# Patient Record
Sex: FEMALE | Race: BLACK OR AFRICAN AMERICAN | Employment: UNEMPLOYED | URBAN - METROPOLITAN AREA
[De-identification: names, ages, dates, MRNs, and addresses within clinical notes are randomized per-mention and may not be internally consistent; named-entity substitution may affect disease eponyms.]

---

## 2022-02-23 ENCOUNTER — HOSPITAL ENCOUNTER (OUTPATIENT)
Age: 62
Discharge: HOME | End: 2022-02-23

## 2022-02-23 ENCOUNTER — HOSPITAL ENCOUNTER (OUTPATIENT)
Dept: GENERAL RADIOLOGY | Age: 62
Discharge: HOME | End: 2022-02-23

## 2022-02-23 DIAGNOSIS — R06.02 SHORTNESS OF BREATH: ICD-10-CM

## 2022-02-23 PROCEDURE — 71046 X-RAY EXAM CHEST 2 VIEWS: CPT

## 2023-05-05 ENCOUNTER — HOSPITAL ENCOUNTER (OUTPATIENT)
Age: 63
Discharge: HOME | End: 2023-05-05
Payer: COMMERCIAL

## 2023-05-05 LAB
25(OH)D3 SERPL-MCNC: 15.9 NG/ML
ALBUMIN SERPL-MCNC: 4.4 G/DL (ref 3.4–5)
ALBUMIN/GLOB SERPL: 1.1 {RATIO} (ref 1.1–2.2)
ALP SERPL-CCNC: 58 U/L (ref 40–129)
ALT SERPL-CCNC: 16 U/L (ref 10–40)
ANION GAP SERPL CALCULATED.3IONS-SCNC: 7 MMOL/L (ref 3–16)
AST SERPL-CCNC: 20 U/L (ref 15–37)
BASOPHILS # BLD: 0 K/UL (ref 0–0.2)
BASOPHILS NFR BLD: 0.2 %
BILIRUB SERPL-MCNC: 0.4 MG/DL (ref 0–1)
BUN SERPL-MCNC: 10 MG/DL (ref 7–20)
CALCIUM SERPL-MCNC: 9.6 MG/DL (ref 8.3–10.6)
CHLORIDE SERPL-SCNC: 101 MMOL/L (ref 99–110)
CHOLEST SERPL-MCNC: 122 MG/DL (ref 0–199)
CO2 SERPL-SCNC: 30 MMOL/L (ref 21–32)
CREAT SERPL-MCNC: 0.6 MG/DL (ref 0.6–1.2)
CREAT UR-MCNC: 101.6 MG/DL (ref 28–259)
DEPRECATED RDW RBC AUTO: 17.5 % (ref 12.4–15.4)
EOSINOPHIL # BLD: 0.2 K/UL (ref 0–0.6)
EOSINOPHIL NFR BLD: 2.7 %
EST. AVERAGE GLUCOSE BLD GHB EST-MCNC: 145.6 MG/DL
FOLATE SERPL-MCNC: 5.39 NG/ML (ref 4.78–24.2)
GFR SERPLBLD CREATININE-BSD FMLA CKD-EPI: >60 ML/MIN/{1.73_M2}
GLUCOSE SERPL-MCNC: 129 MG/DL (ref 70–99)
HBA1C MFR BLD: 6.7 %
HCT VFR BLD AUTO: 38.1 % (ref 36–48)
HDLC SERPL-MCNC: 44 MG/DL (ref 40–60)
HGB BLD-MCNC: 12 G/DL (ref 12–16)
LDLC SERPL CALC-MCNC: 67 MG/DL
LYMPHOCYTES # BLD: 2.4 K/UL (ref 1–5.1)
LYMPHOCYTES NFR BLD: 33.5 %
MCH RBC QN AUTO: 22.3 PG (ref 26–34)
MCHC RBC AUTO-ENTMCNC: 31.5 G/DL (ref 31–36)
MCV RBC AUTO: 70.7 FL (ref 80–100)
MICROALBUMIN UR DL<=1MG/L-MCNC: 1.4 MG/DL
MICROALBUMIN/CREAT UR: 13.8 MG/G (ref 0–30)
MONOCYTES # BLD: 0.7 K/UL (ref 0–1.3)
MONOCYTES NFR BLD: 9.9 %
NEUTROPHILS # BLD: 3.8 K/UL (ref 1.7–7.7)
NEUTROPHILS NFR BLD: 53.7 %
PLATELET # BLD AUTO: 272 K/UL (ref 135–450)
PMV BLD AUTO: 9.8 FL (ref 5–10.5)
POTASSIUM SERPL-SCNC: 4.6 MMOL/L (ref 3.5–5.1)
PROT SERPL-MCNC: 8.3 G/DL (ref 6.4–8.2)
RBC # BLD AUTO: 5.39 M/UL (ref 4–5.2)
SODIUM SERPL-SCNC: 138 MMOL/L (ref 136–145)
T4 FREE SERPL-MCNC: 1.4 NG/DL (ref 0.9–1.8)
TRIGL SERPL-MCNC: 55 MG/DL (ref 0–150)
TSH SERPL DL<=0.005 MIU/L-ACNC: 2 UIU/ML (ref 0.27–4.2)
VIT B12 SERPL-MCNC: 1014 PG/ML (ref 211–911)
VLDLC SERPL CALC-MCNC: 11 MG/DL
WBC # BLD AUTO: 7 K/UL (ref 4–11)

## 2023-05-05 PROCEDURE — 83036 HEMOGLOBIN GLYCOSYLATED A1C: CPT

## 2023-05-05 PROCEDURE — 84443 ASSAY THYROID STIM HORMONE: CPT

## 2023-05-05 PROCEDURE — 82306 VITAMIN D 25 HYDROXY: CPT

## 2023-05-05 PROCEDURE — 84439 ASSAY OF FREE THYROXINE: CPT

## 2023-05-05 PROCEDURE — 82570 ASSAY OF URINE CREATININE: CPT

## 2023-05-05 PROCEDURE — 82746 ASSAY OF FOLIC ACID SERUM: CPT

## 2023-05-05 PROCEDURE — 80061 LIPID PANEL: CPT

## 2023-05-05 PROCEDURE — 80053 COMPREHEN METABOLIC PANEL: CPT

## 2023-05-05 PROCEDURE — 82043 UR ALBUMIN QUANTITATIVE: CPT

## 2023-05-05 PROCEDURE — 85025 COMPLETE CBC W/AUTO DIFF WBC: CPT

## 2023-05-05 PROCEDURE — 82607 VITAMIN B-12: CPT

## 2023-05-10 ENCOUNTER — HOSPITAL ENCOUNTER (OUTPATIENT)
Age: 63
Discharge: HOME | End: 2023-05-10
Payer: COMMERCIAL

## 2023-05-10 LAB — HEMOCCULT SP1 STL QL: NORMAL

## 2023-05-10 PROCEDURE — 82270 OCCULT BLOOD FECES: CPT

## 2023-05-19 ENCOUNTER — HOSPITAL ENCOUNTER (OUTPATIENT)
Dept: MAMMOGRAPHY | Age: 63
Discharge: HOME | End: 2023-05-19
Payer: COMMERCIAL

## 2023-05-19 VITALS — BODY MASS INDEX: 43.19 KG/M2 | WEIGHT: 220 LBS | HEIGHT: 60 IN

## 2023-05-19 DIAGNOSIS — Z13.9 ENCOUNTER FOR SCREENING: ICD-10-CM

## 2023-05-19 PROCEDURE — 77063 BREAST TOMOSYNTHESIS BI: CPT

## 2023-06-13 PROBLEM — E78.5 HYPERLIPIDEMIA: Status: ACTIVE | Noted: 2023-06-13

## 2023-06-13 PROBLEM — I10 HYPERTENSION: Status: ACTIVE | Noted: 2023-06-13

## 2023-06-13 PROBLEM — R07.9 CHEST PAIN: Status: ACTIVE | Noted: 2023-06-13

## 2023-06-19 ENCOUNTER — OFFICE VISIT (OUTPATIENT)
Dept: CARDIOLOGY CLINIC | Age: 63
End: 2023-06-19
Payer: COMMERCIAL

## 2023-06-19 VITALS
OXYGEN SATURATION: 98 % | BODY MASS INDEX: 37.02 KG/M2 | HEART RATE: 81 BPM | SYSTOLIC BLOOD PRESSURE: 136 MMHG | DIASTOLIC BLOOD PRESSURE: 84 MMHG | WEIGHT: 222.2 LBS | HEIGHT: 65 IN

## 2023-06-19 DIAGNOSIS — R07.9 CHEST PAIN, UNSPECIFIED TYPE: Primary | ICD-10-CM

## 2023-06-19 DIAGNOSIS — I10 HYPERTENSION, UNSPECIFIED TYPE: ICD-10-CM

## 2023-06-19 DIAGNOSIS — E78.5 HYPERLIPIDEMIA, UNSPECIFIED HYPERLIPIDEMIA TYPE: ICD-10-CM

## 2023-06-19 PROCEDURE — 3078F DIAST BP <80 MM HG: CPT | Performed by: INTERNAL MEDICINE

## 2023-06-19 PROCEDURE — 3074F SYST BP LT 130 MM HG: CPT | Performed by: INTERNAL MEDICINE

## 2023-06-19 PROCEDURE — 99204 OFFICE O/P NEW MOD 45 MIN: CPT | Performed by: INTERNAL MEDICINE

## 2023-06-19 PROCEDURE — 93000 ELECTROCARDIOGRAM COMPLETE: CPT | Performed by: INTERNAL MEDICINE

## 2023-06-19 RX ORDER — ASPIRIN 81 MG/1
81 TABLET, CHEWABLE ORAL DAILY
COMMUNITY

## 2023-06-19 RX ORDER — METOPROLOL SUCCINATE 25 MG/1
25 TABLET, EXTENDED RELEASE ORAL DAILY
COMMUNITY

## 2023-06-19 RX ORDER — ATORVASTATIN CALCIUM 80 MG/1
80 TABLET, FILM COATED ORAL DAILY
COMMUNITY

## 2023-06-19 RX ORDER — OMEPRAZOLE 20 MG/1
CAPSULE, DELAYED RELEASE ORAL
COMMUNITY
Start: 2023-05-04

## 2023-06-19 RX ORDER — GABAPENTIN 300 MG/1
300 CAPSULE ORAL 3 TIMES DAILY
COMMUNITY

## 2023-06-19 RX ORDER — LOSARTAN POTASSIUM 50 MG/1
50 TABLET ORAL DAILY
COMMUNITY

## 2023-06-19 NOTE — PATIENT INSTRUCTIONS
Stress test  Continue risk factor modifications. Call for any change in symptoms, call to report any changes in shortness of breath or development of chest pain with activity.     Follow up as needed

## 2023-06-26 ENCOUNTER — TELEPHONE (OUTPATIENT)
Dept: CARDIOLOGY CLINIC | Age: 63
End: 2023-06-26

## 2024-01-08 ENCOUNTER — HOSPITAL ENCOUNTER (OUTPATIENT)
Age: 64
Discharge: HOME | End: 2024-01-08
Payer: COMMERCIAL

## 2024-01-08 LAB
25(OH)D3 SERPL-MCNC: 24.9 NG/ML
ALBUMIN SERPL-MCNC: 4.5 G/DL (ref 3.4–5)
ALBUMIN/GLOB SERPL: 1.2 {RATIO} (ref 1.1–2.2)
ALP SERPL-CCNC: 75 U/L (ref 40–129)
ALT SERPL-CCNC: 13 U/L (ref 10–40)
ANION GAP SERPL CALCULATED.3IONS-SCNC: 9 MMOL/L (ref 3–16)
AST SERPL-CCNC: 18 U/L (ref 15–37)
BILIRUB SERPL-MCNC: 0.4 MG/DL (ref 0–1)
BUN SERPL-MCNC: 10 MG/DL (ref 7–20)
CALCIUM SERPL-MCNC: 9.9 MG/DL (ref 8.3–10.6)
CHLORIDE SERPL-SCNC: 97 MMOL/L (ref 99–110)
CHOLEST SERPL-MCNC: 158 MG/DL (ref 0–199)
CO2 SERPL-SCNC: 32 MMOL/L (ref 21–32)
CREAT SERPL-MCNC: <0.5 MG/DL (ref 0.6–1.2)
FOLATE SERPL-MCNC: 4.5 NG/ML (ref 4.78–24.2)
GFR SERPLBLD CREATININE-BSD FMLA CKD-EPI: >60 ML/MIN/{1.73_M2}
GLUCOSE SERPL-MCNC: 137 MG/DL (ref 70–99)
HDLC SERPL-MCNC: 47 MG/DL (ref 40–60)
LDLC SERPL CALC-MCNC: 96 MG/DL
POTASSIUM SERPL-SCNC: 4.6 MMOL/L (ref 3.5–5.1)
PROT SERPL-MCNC: 8.3 G/DL (ref 6.4–8.2)
SODIUM SERPL-SCNC: 138 MMOL/L (ref 136–145)
T4 FREE SERPL-MCNC: 1.4 NG/DL (ref 0.9–1.8)
TRIGL SERPL-MCNC: 76 MG/DL (ref 0–150)
TSH SERPL DL<=0.005 MIU/L-ACNC: 2.05 UIU/ML (ref 0.27–4.2)
VIT B12 SERPL-MCNC: 921 PG/ML (ref 211–911)
VLDLC SERPL CALC-MCNC: 15 MG/DL

## 2024-01-08 PROCEDURE — 85025 COMPLETE CBC W/AUTO DIFF WBC: CPT

## 2024-01-08 PROCEDURE — 36415 COLL VENOUS BLD VENIPUNCTURE: CPT

## 2024-01-08 PROCEDURE — 80061 LIPID PANEL: CPT

## 2024-01-08 PROCEDURE — 80053 COMPREHEN METABOLIC PANEL: CPT

## 2024-01-08 PROCEDURE — 83036 HEMOGLOBIN GLYCOSYLATED A1C: CPT

## 2024-01-08 PROCEDURE — 84443 ASSAY THYROID STIM HORMONE: CPT

## 2024-01-08 PROCEDURE — 82607 VITAMIN B-12: CPT

## 2024-01-08 PROCEDURE — 82746 ASSAY OF FOLIC ACID SERUM: CPT

## 2024-01-08 PROCEDURE — 84439 ASSAY OF FREE THYROXINE: CPT

## 2024-01-08 PROCEDURE — 82306 VITAMIN D 25 HYDROXY: CPT

## 2024-01-09 LAB
BASOPHILS # BLD: 0.1 K/UL (ref 0–0.2)
BASOPHILS NFR BLD: 1.3 %
DEPRECATED RDW RBC AUTO: 15.6 % (ref 12.4–15.4)
EOSINOPHIL # BLD: 0.1 K/UL (ref 0–0.6)
EOSINOPHIL NFR BLD: 1.8 %
EST. AVERAGE GLUCOSE BLD GHB EST-MCNC: 180 MG/DL
HBA1C MFR BLD: 7.9 %
HCT VFR BLD AUTO: 36.8 % (ref 36–48)
HGB BLD-MCNC: 11.6 G/DL (ref 12–16)
LYMPHOCYTES # BLD: 1.9 K/UL (ref 1–5.1)
LYMPHOCYTES NFR BLD: 26.5 %
MCH RBC QN AUTO: 21.9 PG (ref 26–34)
MCHC RBC AUTO-ENTMCNC: 31.6 G/DL (ref 31–36)
MCV RBC AUTO: 69.3 FL (ref 80–100)
MONOCYTES # BLD: 0.7 K/UL (ref 0–1.3)
MONOCYTES NFR BLD: 10.5 %
NEUTROPHILS # BLD: 4.2 K/UL (ref 1.7–7.7)
NEUTROPHILS NFR BLD: 59.9 %
PATH INTERP BLD-IMP: NO
PLATELET # BLD AUTO: 265 K/UL (ref 135–450)
PMV BLD AUTO: 9.1 FL (ref 5–10.5)
RBC # BLD AUTO: 5.31 M/UL (ref 4–5.2)
WBC # BLD AUTO: 7 K/UL (ref 4–11)

## 2024-01-26 ENCOUNTER — OFFICE VISIT (OUTPATIENT)
Dept: ENT CLINIC | Age: 64
End: 2024-01-26
Payer: COMMERCIAL

## 2024-01-26 VITALS
SYSTOLIC BLOOD PRESSURE: 161 MMHG | TEMPERATURE: 97.5 F | OXYGEN SATURATION: 94 % | BODY MASS INDEX: 39.27 KG/M2 | HEIGHT: 64 IN | HEART RATE: 109 BPM | DIASTOLIC BLOOD PRESSURE: 94 MMHG | WEIGHT: 230 LBS

## 2024-01-26 DIAGNOSIS — R13.10 ODYNOPHAGIA: ICD-10-CM

## 2024-01-26 DIAGNOSIS — K21.9 LPRD (LARYNGOPHARYNGEAL REFLUX DISEASE): ICD-10-CM

## 2024-01-26 DIAGNOSIS — R13.10 DYSPHAGIA, UNSPECIFIED TYPE: Primary | ICD-10-CM

## 2024-01-26 PROCEDURE — 3077F SYST BP >= 140 MM HG: CPT | Performed by: OTOLARYNGOLOGY

## 2024-01-26 PROCEDURE — 99203 OFFICE O/P NEW LOW 30 MIN: CPT | Performed by: OTOLARYNGOLOGY

## 2024-01-26 PROCEDURE — 3080F DIAST BP >= 90 MM HG: CPT | Performed by: OTOLARYNGOLOGY

## 2024-01-26 RX ORDER — GLIPIZIDE 5 MG/1
5 TABLET ORAL
COMMUNITY

## 2024-01-26 RX ORDER — CETIRIZINE HYDROCHLORIDE 10 MG/1
10 TABLET ORAL DAILY
COMMUNITY

## 2024-01-26 RX ORDER — FLUOXETINE 20 MG/1
20 TABLET ORAL DAILY
COMMUNITY

## 2024-01-26 NOTE — PATIENT INSTRUCTIONS
You may use over the counter allergy medications, such as Allegra/fexofenadine, Claritin/loratadine or Zyrtec/cetirizine, as needed for itching of the of the throat, or other allergy symptoms.    You may use Acetaminophen (Tylenol) or Ibuprofen (Advil, Motrin IB) or naprosyn (Aleve) (over the counter medications) as needed for fever or pain.  Do not eat or drink until 11:15 AM today.

## 2024-01-26 NOTE — PROGRESS NOTES
[facility]   DIVISION OF OTOLARYNGOLOGY- HEAD & NECK SURGERY  [facility]           NEW PATIENT VISIT      PCP:  Nadia Burgess APRN - CNP      REFERRED BY:   Nadia Burgess APRN - CNP       CHIEF COMPLAINT    Chief Complaint   Patient presents with    Dysphagia        HISTORY OF PRESENT ILLNESS     Sheila Spencer is a 63 y.o. female who was referred for evaluation and treatment of difficulty swallowing and pain and itching in the throat for about two months, has been on and off but now it is constant for about one month.  Feels like choking on medication.  Itching occurs when not eating and it is not present when she is eating.  Has difficulty swallowing food, it is hard to swallow but does not get stuck.  Here with son, Wilman.      No h/o seasonal / environmental allergies.     An audio-video language interpetor was used via i-Pad.        REVIEW OF SYSTEMS   Review of Systems   Constitutional:  Negative for chills and fever.   HENT:  Positive for ear pain (both ears, constant for two months). Negative for congestion, ear discharge, hearing loss, rhinorrhea, sinus pain, tinnitus and voice change.          PAST MEDICAL HISTORY    Past Medical History:   Diagnosis Date    Hyperlipidemia     Hypertension        No past surgical history on file.      Current Outpatient Medications   Medication Sig Dispense Refill    FLUoxetine HCl, PMDD, 20 MG TABS Take 20 mg by mouth daily      glipiZIDE (GLUCOTROL) 5 MG tablet Take 1 tablet by mouth 2 times daily (before meals)      cetirizine (ZYRTEC) 10 MG tablet Take 1 tablet by mouth daily      omeprazole (PRILOSEC) 20 MG delayed release capsule       aspirin 81 MG chewable tablet Take 1 tablet by mouth daily      losartan (COZAAR) 50 MG tablet Take 1 tablet by mouth daily      gabapentin (NEURONTIN) 300 MG capsule Take 1 capsule by mouth 3 times daily.      metFORMIN (GLUCOPHAGE) 1000 MG tablet       metoprolol succinate (TOPROL XL) 25 MG extended release

## 2024-05-13 ENCOUNTER — HOSPITAL ENCOUNTER (OUTPATIENT)
Age: 64
Discharge: HOME | End: 2024-05-13
Payer: COMMERCIAL

## 2024-05-13 LAB
25(OH)D3 SERPL-MCNC: 20.5 NG/ML
ALBUMIN SERPL-MCNC: 4.3 G/DL (ref 3.4–5)
ALBUMIN/GLOB SERPL: 1 {RATIO} (ref 1.1–2.2)
ALP SERPL-CCNC: 69 U/L (ref 40–129)
ALT SERPL-CCNC: 17 U/L (ref 10–40)
ANION GAP SERPL CALCULATED.3IONS-SCNC: 9 MMOL/L (ref 3–16)
ANISOCYTOSIS BLD QL SMEAR: ABNORMAL
AST SERPL-CCNC: 20 U/L (ref 15–37)
BASOPHILS # BLD: 0 K/UL (ref 0–0.2)
BASOPHILS NFR BLD: 0.4 %
BILIRUB SERPL-MCNC: 0.3 MG/DL (ref 0–1)
BUN SERPL-MCNC: 9 MG/DL (ref 7–20)
CALCIUM SERPL-MCNC: 9.9 MG/DL (ref 8.3–10.6)
CHLORIDE SERPL-SCNC: 99 MMOL/L (ref 99–110)
CHOLEST SERPL-MCNC: 127 MG/DL (ref 0–199)
CO2 SERPL-SCNC: 32 MMOL/L (ref 21–32)
CREAT SERPL-MCNC: <0.5 MG/DL (ref 0.6–1.2)
DEPRECATED RDW RBC AUTO: 16.2 % (ref 12.4–15.4)
EKG ATRIAL RATE: 74 BPM
EKG DIAGNOSIS: NORMAL
EKG P AXIS: 62 DEGREES
EKG P-R INTERVAL: 164 MS
EKG Q-T INTERVAL: 410 MS
EKG QRS DURATION: 94 MS
EKG QTC CALCULATION (BAZETT): 455 MS
EKG R AXIS: 40 DEGREES
EKG T AXIS: 40 DEGREES
EKG VENTRICULAR RATE: 74 BPM
EOSINOPHIL # BLD: 0.2 K/UL (ref 0–0.6)
EOSINOPHIL NFR BLD: 2.1 %
FOLATE SERPL-MCNC: 7.6 NG/ML (ref 4.78–24.2)
GFR SERPLBLD CREATININE-BSD FMLA CKD-EPI: >90 ML/MIN/{1.73_M2}
GLUCOSE SERPL-MCNC: 157 MG/DL (ref 70–99)
HCT VFR BLD AUTO: 36.4 % (ref 36–48)
HDLC SERPL-MCNC: 40 MG/DL (ref 40–60)
HGB BLD-MCNC: 11.5 G/DL (ref 12–16)
LDLC SERPL CALC-MCNC: 72 MG/DL
LYMPHOCYTES # BLD: 1.9 K/UL (ref 1–5.1)
LYMPHOCYTES NFR BLD: 25.1 %
MAGNESIUM SERPL-MCNC: 1.8 MG/DL (ref 1.8–2.4)
MCH RBC QN AUTO: 21.8 PG (ref 26–34)
MCHC RBC AUTO-ENTMCNC: 31.7 G/DL (ref 31–36)
MCV RBC AUTO: 68.8 FL (ref 80–100)
MICROCYTES BLD QL SMEAR: ABNORMAL
MONOCYTES # BLD: 0.6 K/UL (ref 0–1.3)
MONOCYTES NFR BLD: 8.5 %
NEUTROPHILS # BLD: 4.8 K/UL (ref 1.7–7.7)
NEUTROPHILS NFR BLD: 63.9 %
PATH INTERP BLD-IMP: YES
PLATELET # BLD AUTO: 289 K/UL (ref 135–450)
PMV BLD AUTO: 9.2 FL (ref 5–10.5)
POIKILOCYTOSIS BLD QL SMEAR: ABNORMAL
POTASSIUM SERPL-SCNC: 4.5 MMOL/L (ref 3.5–5.1)
PROT SERPL-MCNC: 8.5 G/DL (ref 6.4–8.2)
RBC # BLD AUTO: 5.29 M/UL (ref 4–5.2)
SODIUM SERPL-SCNC: 140 MMOL/L (ref 136–145)
T4 FREE SERPL-MCNC: 1.5 NG/DL (ref 0.9–1.8)
TARGETS BLD QL SMEAR: ABNORMAL
TRIGL SERPL-MCNC: 74 MG/DL (ref 0–150)
TSH SERPL DL<=0.005 MIU/L-ACNC: 1.96 UIU/ML (ref 0.27–4.2)
VIT B12 SERPL-MCNC: 816 PG/ML (ref 211–911)
VLDLC SERPL CALC-MCNC: 15 MG/DL
WBC # BLD AUTO: 7.5 K/UL (ref 4–11)

## 2024-05-13 PROCEDURE — 93010 ELECTROCARDIOGRAM REPORT: CPT | Performed by: INTERNAL MEDICINE

## 2024-05-13 PROCEDURE — 83013 H PYLORI (C-13) BREATH: CPT

## 2024-05-13 PROCEDURE — 82746 ASSAY OF FOLIC ACID SERUM: CPT

## 2024-05-13 PROCEDURE — 83036 HEMOGLOBIN GLYCOSYLATED A1C: CPT

## 2024-05-13 PROCEDURE — 84439 ASSAY OF FREE THYROXINE: CPT

## 2024-05-13 PROCEDURE — 80061 LIPID PANEL: CPT

## 2024-05-13 PROCEDURE — 83735 ASSAY OF MAGNESIUM: CPT

## 2024-05-13 PROCEDURE — 85025 COMPLETE CBC W/AUTO DIFF WBC: CPT

## 2024-05-13 PROCEDURE — 84443 ASSAY THYROID STIM HORMONE: CPT

## 2024-05-13 PROCEDURE — 82306 VITAMIN D 25 HYDROXY: CPT

## 2024-05-13 PROCEDURE — 82607 VITAMIN B-12: CPT

## 2024-05-13 PROCEDURE — 80053 COMPREHEN METABOLIC PANEL: CPT

## 2024-05-13 PROCEDURE — 93005 ELECTROCARDIOGRAM TRACING: CPT | Performed by: FAMILY MEDICINE

## 2024-05-14 LAB
EST. AVERAGE GLUCOSE BLD GHB EST-MCNC: 211.6 MG/DL
HBA1C MFR BLD: 9 %

## 2024-05-15 LAB — PATH INTERP BLD-IMP: NORMAL

## 2024-05-16 LAB — H PYLORI BREATH TEST: POSITIVE

## 2024-05-20 ENCOUNTER — TELEPHONE (OUTPATIENT)
Dept: CARDIOLOGY CLINIC | Age: 64
End: 2024-05-20

## 2024-05-20 NOTE — TELEPHONE ENCOUNTER
Patient was referred by Nadia RUANO to the [facility]  location with NPTS.  Patient has been scheduled for 05/22/24 at 10:15 (am/pm).  Patient verbalized understanding of appointment instructions.  Call complete    Last visit LES 06/19/23

## 2024-05-22 ENCOUNTER — OFFICE VISIT (OUTPATIENT)
Dept: CARDIOLOGY CLINIC | Age: 64
End: 2024-05-22
Payer: COMMERCIAL

## 2024-05-22 VITALS
HEART RATE: 72 BPM | WEIGHT: 230 LBS | HEIGHT: 64 IN | BODY MASS INDEX: 39.27 KG/M2 | OXYGEN SATURATION: 97 % | SYSTOLIC BLOOD PRESSURE: 144 MMHG | DIASTOLIC BLOOD PRESSURE: 72 MMHG

## 2024-05-22 DIAGNOSIS — R00.2 HEART PALPITATIONS: ICD-10-CM

## 2024-05-22 DIAGNOSIS — E78.2 MIXED HYPERLIPIDEMIA: ICD-10-CM

## 2024-05-22 DIAGNOSIS — I10 PRIMARY HYPERTENSION: ICD-10-CM

## 2024-05-22 DIAGNOSIS — R06.83 SNORES: ICD-10-CM

## 2024-05-22 DIAGNOSIS — R06.02 SHORTNESS OF BREATH: ICD-10-CM

## 2024-05-22 DIAGNOSIS — R07.9 CHEST PAIN, UNSPECIFIED TYPE: ICD-10-CM

## 2024-05-22 DIAGNOSIS — R07.89 OTHER CHEST PAIN: Primary | ICD-10-CM

## 2024-05-22 PROCEDURE — 3077F SYST BP >= 140 MM HG: CPT | Performed by: NURSE PRACTITIONER

## 2024-05-22 PROCEDURE — 3078F DIAST BP <80 MM HG: CPT | Performed by: NURSE PRACTITIONER

## 2024-05-22 PROCEDURE — 1123F ACP DISCUSS/DSCN MKR DOCD: CPT | Performed by: NURSE PRACTITIONER

## 2024-05-22 PROCEDURE — 93000 ELECTROCARDIOGRAM COMPLETE: CPT | Performed by: NURSE PRACTITIONER

## 2024-05-22 PROCEDURE — 99214 OFFICE O/P EST MOD 30 MIN: CPT | Performed by: NURSE PRACTITIONER

## 2024-05-22 RX ORDER — AMLODIPINE BESYLATE 10 MG/1
TABLET ORAL
COMMUNITY
Start: 2024-05-09

## 2024-05-22 RX ORDER — LOSARTAN POTASSIUM 50 MG/1
50 TABLET ORAL 2 TIMES DAILY
Qty: 180 TABLET | Refills: 2 | Status: SHIPPED | OUTPATIENT
Start: 2024-05-22

## 2024-05-22 NOTE — PATIENT INSTRUCTIONS
Increase losartan to 50 mg twice a day for better control    Referral to pulmonologist for a sleep evaluation ; see if you have sleep apnea causing you to fell short of breath    Exercise myoview : assess your heart's circulation     Appt in 6 weeks

## 2024-05-22 NOTE — PROGRESS NOTES
[facility]     Outpatient Follow Up Note    Sheila Spencer is 64 y.o. female who presents today with a history of CP, HTN, hyperlipidemia and palpitations.      CHIEF COMPLAINT / HPI:  Follow Up secondary to CP & SOB    Subjective:    She was referred by her primary NP. Sometimes when she breathes she feels like she stops. Sometimes she's sitting when it happens. Its been ongoing for more that 6 years.   She snores but not checked for sleep apnea. The patient denies orthopnea/PND    She reports chest discomfort (for the past 5 yrs).  It was attributed to muscular in origin when she worked. She still has episodes despite no longer working. The frequency varies, may be one week without having any episodes. She has associated nausea. It goes away after resting. However, she says the duration can be constant up to 3 days  The patient has swelling in her feet as the day progresses. The patients weight is up ~ 8# / one year by office scales. The patient is experiencing palpitations and may happen daily.    Her home BP runs 152/90 ; sometimes it gets as low as 145/ . Her average was 135/76 two years ago    She did not show for a NM as recommended / scheduled at her last appt nearly one year ago.    These symptoms show no change since the last OV.   With regard to medication therapy the patient has been compliant with prescribed regimen. They have tolerated therapy to date.   Pt from [place] and speaks [place] ; speaks English and understands slowly spoken words. Son is with her today to provide assistance.     Past Medical History:   Diagnosis Date    Hyperlipidemia     Hypertension      Social History:    Social History     Tobacco Use   Smoking Status Never    Passive exposure: Never   Smokeless Tobacco Never     Current Medications:  Current Outpatient Medications   Medication Sig Dispense Refill    amLODIPine (NORVASC) 10 MG tablet       FLUoxetine HCl, PMDD, 20 MG TABS Take 20 mg by mouth daily      glipiZIDE

## 2024-11-20 ENCOUNTER — HOSPITAL ENCOUNTER (OUTPATIENT)
Age: 64
Discharge: HOME | End: 2024-11-22
Payer: COMMERCIAL

## 2024-11-20 ENCOUNTER — TELEPHONE (OUTPATIENT)
Dept: CARDIOLOGY CLINIC | Age: 64
End: 2024-11-20

## 2024-11-20 VITALS
WEIGHT: 220 LBS | HEART RATE: 89 BPM | HEIGHT: 64 IN | SYSTOLIC BLOOD PRESSURE: 167 MMHG | DIASTOLIC BLOOD PRESSURE: 90 MMHG | BODY MASS INDEX: 37.56 KG/M2 | RESPIRATION RATE: 18 BRPM

## 2024-11-20 DIAGNOSIS — R07.89 OTHER CHEST PAIN: ICD-10-CM

## 2024-11-20 DIAGNOSIS — R07.9 CHEST PAIN, UNSPECIFIED TYPE: ICD-10-CM

## 2024-11-20 DIAGNOSIS — R06.02 SHORTNESS OF BREATH: ICD-10-CM

## 2024-11-20 LAB
ECHO BSA: 2.12 M2
NUC STRESS EJECTION FRACTION: 67 %
NUC STRESS LV EDV: 103 ML (ref 56–104)
NUC STRESS LV ESV: 34 ML (ref 19–49)
NUC STRESS LV MASS: 136 G
STRESS BASELINE DIAS BP: 86 MMHG
STRESS BASELINE HR: 78 BPM
STRESS BASELINE SYS BP: 190 MMHG
STRESS ESTIMATED WORKLOAD: 4.6 METS
STRESS EXERCISE DUR MIN: 1 MIN
STRESS EXERCISE DUR SEC: 52 SEC
STRESS O2 SAT PEAK: 97 %
STRESS O2 SAT REST: 96 %
STRESS PEAK DIAS BP: 81 MMHG
STRESS PEAK SYS BP: 201 MMHG
STRESS PERCENT HR ACHIEVED: 91 %
STRESS POST PEAK HR: 141 BPM
STRESS RATE PRESSURE PRODUCT: NORMAL BPM*MMHG
STRESS TARGET HR: 155 BPM
TID: 0.93

## 2024-11-20 PROCEDURE — 78452 HT MUSCLE IMAGE SPECT MULT: CPT

## 2024-11-20 PROCEDURE — 3430000000 HC RX DIAGNOSTIC RADIOPHARMACEUTICAL: Performed by: NURSE PRACTITIONER

## 2024-11-20 PROCEDURE — A9502 TC99M TETROFOSMIN: HCPCS | Performed by: NURSE PRACTITIONER

## 2024-11-20 PROCEDURE — 93017 CV STRESS TEST TRACING ONLY: CPT

## 2024-11-20 RX ADMIN — TETROFOSMIN 10.9 MILLICURIE: 1.38 INJECTION, POWDER, LYOPHILIZED, FOR SOLUTION INTRAVENOUS at 08:16

## 2024-11-20 RX ADMIN — TETROFOSMIN 32.5 MILLICURIE: 1.38 INJECTION, POWDER, LYOPHILIZED, FOR SOLUTION INTRAVENOUS at 09:19

## 2024-11-20 NOTE — TELEPHONE ENCOUNTER
I spoke with pt's son and relayed nuclear stress test results per NPTS. Pt verbalized understanding.

## 2024-11-20 NOTE — TELEPHONE ENCOUNTER
----- Message from OUMAR Walton CNP sent at 11/20/2024  1:38 PM EST -----  Ok (actually ordered back in May)

## 2025-03-05 ENCOUNTER — TELEPHONE (OUTPATIENT)
Dept: PULMONOLOGY | Age: 65
End: 2025-03-05

## 2025-03-05 NOTE — TELEPHONE ENCOUNTER
Called and spoke to son to ask if he will be with his mother at her appt next week with Dr Robertson to interpret for her. He said he would be here and interpret for her. We do not have her language as part of our  services. Upon looking at last visits, her son was the  for her.

## 2025-03-13 ENCOUNTER — OFFICE VISIT (OUTPATIENT)
Dept: PULMONOLOGY | Age: 65
End: 2025-03-13
Payer: COMMERCIAL

## 2025-03-13 VITALS
WEIGHT: 227 LBS | HEIGHT: 64 IN | SYSTOLIC BLOOD PRESSURE: 102 MMHG | BODY MASS INDEX: 38.76 KG/M2 | DIASTOLIC BLOOD PRESSURE: 80 MMHG | OXYGEN SATURATION: 97 % | HEART RATE: 96 BPM

## 2025-03-13 DIAGNOSIS — I10 PRIMARY HYPERTENSION: ICD-10-CM

## 2025-03-13 DIAGNOSIS — E66.09 CLASS 2 OBESITY DUE TO EXCESS CALORIES WITHOUT SERIOUS COMORBIDITY WITH BODY MASS INDEX (BMI) OF 38.0 TO 38.9 IN ADULT: ICD-10-CM

## 2025-03-13 DIAGNOSIS — G47.33 OSA (OBSTRUCTIVE SLEEP APNEA): Primary | ICD-10-CM

## 2025-03-13 DIAGNOSIS — E66.812 CLASS 2 OBESITY DUE TO EXCESS CALORIES WITHOUT SERIOUS COMORBIDITY WITH BODY MASS INDEX (BMI) OF 38.0 TO 38.9 IN ADULT: ICD-10-CM

## 2025-03-13 PROCEDURE — 3074F SYST BP LT 130 MM HG: CPT | Performed by: INTERNAL MEDICINE

## 2025-03-13 PROCEDURE — 1123F ACP DISCUSS/DSCN MKR DOCD: CPT | Performed by: INTERNAL MEDICINE

## 2025-03-13 PROCEDURE — 3079F DIAST BP 80-89 MM HG: CPT | Performed by: INTERNAL MEDICINE

## 2025-03-13 PROCEDURE — 99204 OFFICE O/P NEW MOD 45 MIN: CPT | Performed by: INTERNAL MEDICINE

## 2025-03-13 RX ORDER — HYDROCHLOROTHIAZIDE 12.5 MG/1
12.5 TABLET ORAL DAILY
COMMUNITY
Start: 2025-02-20

## 2025-03-13 RX ORDER — ALBUTEROL SULFATE 90 UG/1
2 INHALANT RESPIRATORY (INHALATION) EVERY 6 HOURS PRN
COMMUNITY
Start: 2025-02-21

## 2025-03-13 RX ORDER — DULAGLUTIDE 0.75 MG/.5ML
0.75 INJECTION, SOLUTION SUBCUTANEOUS WEEKLY
COMMUNITY
Start: 2025-02-20

## 2025-03-13 RX ORDER — AMLODIPINE BESYLATE 5 MG/1
5 TABLET ORAL DAILY
COMMUNITY
Start: 2025-02-20

## 2025-03-13 NOTE — PROGRESS NOTES
PULMONARY OFFICE NEW PATIENT VISIT    CONSULTING PHYSICIAN:      REASON FOR VISIT:   Chief Complaint   Patient presents with    Snoring     Stated that she snores nightly. States she wakes to use the restroom normally. She states she does have some issues breathing at night.        DATE OF VISIT: 3/13/2025    HISTORY OF PRESENT ILLNESS: 65 y.o. year old female with history of hypertension is here for evaluation of sleep apnea.    Patient complains of snoring with excessive daytime sleepiness and fatigue.  She wakes up gasping and choking in sleep.  Wakes up multiple times in the night to use the bathroom.  Sleep is not refreshed.  She wakes up sleepy and groggy.  Has never been tested for sleep apnea.    Patient has hypertension and follows with cardiology.      REVIEW OF SYSTEMS:   CONSTITUTIONAL SYMPTOMS: The patient denies fever, fatigue, night sweats, weight loss or weight gain.   HEENT: No vision changes. No tinnitus, Denies sinus pain. No hoarseness, or dysphagia.   NECK: Patient denies swelling in the neck.   CARDIOVASCULAR: Denies chest pain, palpitation, syncope.  RESPIRATORY: Denies shortness of breath or cough.   GASTROINTESTINAL: Denies nausea, abdominal pain or change in bowel function.  GENITOURINARY: Denies obstructive symptoms. No history of incontinence.  BREASTS: No masses or lumps in the breasts.   SKIN: No rashes or itching.   MUSCULOSKELETAL: Denies weakness or bone pain.   NEUROLOGICAL: No headaches or seizures.   PSYCHIATRIC: Denies mood swings or depression.   ENDOCRINE: Denies heat or cold intolerance or excessive thirst.  HEMATOLOGIC/LYMPHATIC: Denies easy bruising or lymph node swelling.  ALLERGIC/IMMUNOLOGIC: No environmental allergies.    PAST MEDICAL HISTORY:   Past Medical History:   Diagnosis Date    Hyperlipidemia     Hypertension        PAST SURGICAL HISTORY: History reviewed. No pertinent surgical history.     SOCIAL HISTORY:   Social History     Tobacco Use    Smoking status:

## 2025-04-10 ENCOUNTER — HOSPITAL ENCOUNTER (OUTPATIENT)
Dept: SLEEP CENTER | Age: 65
Discharge: HOME | End: 2025-04-10
Payer: COMMERCIAL

## 2025-04-10 DIAGNOSIS — G47.33 OSA (OBSTRUCTIVE SLEEP APNEA): ICD-10-CM

## 2025-04-10 PROCEDURE — 95806 SLEEP STUDY UNATT&RESP EFFT: CPT

## 2025-04-14 DIAGNOSIS — G47.33 OSA (OBSTRUCTIVE SLEEP APNEA): Primary | ICD-10-CM

## 2025-04-16 ENCOUNTER — TELEPHONE (OUTPATIENT)
Dept: PULMONOLOGY | Age: 65
End: 2025-04-16

## 2025-04-16 NOTE — TELEPHONE ENCOUNTER
Spoke to patient's son and gave HSAT results. He was ok with having the titration study scheduled at this time. Told him that the sleep lab would reach out to schedule the titration study,

## 2025-04-17 ENCOUNTER — TELEPHONE (OUTPATIENT)
Dept: SLEEP CENTER | Age: 65
End: 2025-04-17

## 2025-04-17 NOTE — TELEPHONE ENCOUNTER
Called her son to schedule cpap sleep study per Erika harris for a phone call back     Hst done at [facility]

## 2025-06-10 ENCOUNTER — TELEPHONE (OUTPATIENT)
Dept: CARDIOLOGY CLINIC | Age: 65
End: 2025-06-10

## 2025-06-10 NOTE — TELEPHONE ENCOUNTER
Spoke with patient son lucas. We scheduled echo for 06/23 and caroitd 06/17.     Scheduled cardiac CTA with [facility].     Verified all info with son, order faxed to radiology.    59y/o F w/ HTN, HLD, RA, Asthma s/p elective left transcarotid arterial sten via right femoral vein access (11/13/18). course complicated by code stroke approximately 8 hours postop. CTA head and neck showed a distal M2 branch occlusion. She was deemed not a candidate for intracranial thrombectomy due to the location of the M2 occlusion at that time. Patient monitored in SICU, found to be stable, and sent to the floor. The following day patient was found with possible worsening right side weakness and worsening aphasia. Code stroke called. CT showed no acute infarct and CTA was stable as compared to prior study. Concern by neuro that worsening of right sided deficits and aphasia possibly secondary to hypoperfusion of area supplied by the L M2 high grade stenosis due to fluctuating blood pressure.

## 2025-06-27 ENCOUNTER — TELEPHONE (OUTPATIENT)
Dept: CARDIOLOGY CLINIC | Age: 65
End: 2025-06-27

## 2025-07-02 ENCOUNTER — TELEPHONE (OUTPATIENT)
Dept: CARDIOLOGY CLINIC | Age: 65
End: 2025-07-02

## 2025-07-02 RX ORDER — METOPROLOL TARTRATE 50 MG
TABLET ORAL
Qty: 3 TABLET | Refills: 0 | Status: SHIPPED | OUTPATIENT
Start: 2025-07-02

## 2025-07-02 RX ORDER — SODIUM CHLORIDE 0.9 % (FLUSH) 0.9 %
5-40 SYRINGE (ML) INJECTION EVERY 12 HOURS SCHEDULED
OUTPATIENT
Start: 2025-07-02

## 2025-07-02 NOTE — TELEPHONE ENCOUNTER
[facility] CTA dept states pt has CTA scheduled on 7/17/25 and does not have the metoprolol protocol. Please write script.

## 2025-07-02 NOTE — TELEPHONE ENCOUNTER
LVM for Marcy at [facility] [phone], left message that the RX for the Metoprolol had been sent into the patient's pharmacy.  Advised to call the office with any further questions.

## 2025-07-10 ENCOUNTER — TELEPHONE (OUTPATIENT)
Dept: INTERVENTIONAL RADIOLOGY/VASCULAR | Age: 65
End: 2025-07-10

## 2025-07-21 ENCOUNTER — TELEPHONE (OUTPATIENT)
Dept: CARDIOLOGY CLINIC | Age: 65
End: 2025-07-21

## 2025-07-30 ENCOUNTER — TELEPHONE (OUTPATIENT)
Dept: SLEEP CENTER | Age: 65
End: 2025-07-30

## 2025-07-30 ENCOUNTER — TELEPHONE (OUTPATIENT)
Dept: PULMONOLOGY | Age: 65
End: 2025-07-30

## 2025-07-30 NOTE — TELEPHONE ENCOUNTER
Spoke to son and gave him the information on the Auto CPAP that was sent to [facility] today. Pt was denied for the Titration study by her insurance so an auto CPAP was ordered. This has also been scanned into media. Will send a [employer] message with the info for [facility] per son's request.

## 2025-07-30 NOTE — TELEPHONE ENCOUNTER
Left vm with pt stating insurance denied the in lab sleep study and we cancelled study for 8/3    Dr is ordering apap     Left dr's # to call them with any questions     And sent out email with same information.

## 2025-09-04 ENCOUNTER — TRANSCRIBE ORDERS (OUTPATIENT)
Dept: ADMINISTRATIVE | Age: 65
End: 2025-09-04

## 2025-09-04 DIAGNOSIS — Z12.31 ENCOUNTER FOR SCREENING MAMMOGRAM FOR MALIGNANT NEOPLASM OF BREAST: Primary | ICD-10-CM
